# Patient Record
Sex: FEMALE | Race: WHITE | NOT HISPANIC OR LATINO | URBAN - METROPOLITAN AREA
[De-identification: names, ages, dates, MRNs, and addresses within clinical notes are randomized per-mention and may not be internally consistent; named-entity substitution may affect disease eponyms.]

---

## 2019-08-19 ENCOUNTER — OUTPATIENT (OUTPATIENT)
Dept: INPATIENT UNIT | Facility: HOSPITAL | Age: 24
LOS: 1 days | Discharge: HOME | End: 2019-08-19

## 2019-08-19 ENCOUNTER — EMERGENCY (EMERGENCY)
Facility: HOSPITAL | Age: 24
LOS: 1 days | Discharge: STILL ADMITTED OR ADMINISTRATIVE | End: 2019-08-19
Admitting: EMERGENCY MEDICINE

## 2019-08-19 VITALS
OXYGEN SATURATION: 99 % | RESPIRATION RATE: 20 BRPM | HEART RATE: 98 BPM | SYSTOLIC BLOOD PRESSURE: 123 MMHG | DIASTOLIC BLOOD PRESSURE: 81 MMHG | TEMPERATURE: 99 F

## 2019-08-19 VITALS
RESPIRATION RATE: 18 BRPM | HEART RATE: 108 BPM | DIASTOLIC BLOOD PRESSURE: 62 MMHG | TEMPERATURE: 99 F | SYSTOLIC BLOOD PRESSURE: 112 MMHG

## 2019-08-19 DIAGNOSIS — Z3A.00 WEEKS OF GESTATION OF PREGNANCY NOT SPECIFIED: ICD-10-CM

## 2019-08-19 DIAGNOSIS — O26.899 OTHER SPECIFIED PREGNANCY RELATED CONDITIONS, UNSPECIFIED TRIMESTER: ICD-10-CM

## 2019-08-19 LAB
APPEARANCE UR: CLEAR — SIGNIFICANT CHANGE UP
BACTERIA # UR AUTO: NEGATIVE — SIGNIFICANT CHANGE UP
BILIRUB UR-MCNC: NEGATIVE — SIGNIFICANT CHANGE UP
BLOOD UR QL VISUAL: NEGATIVE — SIGNIFICANT CHANGE UP
COLOR SPEC: YELLOW — SIGNIFICANT CHANGE UP
GLUCOSE UR-MCNC: NEGATIVE — SIGNIFICANT CHANGE UP
HYALINE CASTS # UR AUTO: NEGATIVE — SIGNIFICANT CHANGE UP
KETONES UR-MCNC: NEGATIVE — SIGNIFICANT CHANGE UP
LEUKOCYTE ESTERASE UR-ACNC: NEGATIVE — SIGNIFICANT CHANGE UP
NITRITE UR-MCNC: NEGATIVE — SIGNIFICANT CHANGE UP
PH UR: 6 — SIGNIFICANT CHANGE UP (ref 5–8)
PROT UR-MCNC: 30 — SIGNIFICANT CHANGE UP
RBC CASTS # UR COMP ASSIST: SIGNIFICANT CHANGE UP (ref 0–?)
SP GR SPEC: 1.02 — SIGNIFICANT CHANGE UP (ref 1–1.04)
SQUAMOUS # UR AUTO: SIGNIFICANT CHANGE UP
UROBILINOGEN FLD QL: SIGNIFICANT CHANGE UP
WBC UR QL: SIGNIFICANT CHANGE UP (ref 0–?)

## 2019-08-19 RX ORDER — ONDANSETRON 8 MG/1
8 TABLET, FILM COATED ORAL ONCE
Refills: 0 | Status: DISCONTINUED | OUTPATIENT
Start: 2019-08-19 | End: 2019-08-19

## 2019-08-19 RX ORDER — ACETAMINOPHEN 500 MG
975 TABLET ORAL ONCE
Refills: 0 | Status: COMPLETED | OUTPATIENT
Start: 2019-08-19 | End: 2019-08-19

## 2019-08-19 RX ORDER — SODIUM CHLORIDE 9 MG/ML
500 INJECTION, SOLUTION INTRAVENOUS ONCE
Refills: 0 | Status: DISCONTINUED | OUTPATIENT
Start: 2019-08-19 | End: 2019-08-19

## 2019-08-19 RX ADMIN — Medication 975 MILLIGRAM(S): at 22:02

## 2019-08-19 NOTE — OB PROVIDER TRIAGE NOTE - ADDITIONAL INSTRUCTIONS
Follow up at next scheduled prenatal visit at [facility]  Return for decreased fetal movement, loss of fluid or vaginal bleeding  Tylenol every 6 hours for throat soreness as directed  Plain Over the Counter Robitussin as directed for cough  Signs and Symptoms of labor reviewed

## 2019-08-19 NOTE — OB RN TRIAGE NOTE - CHIEF COMPLAINT QUOTE
sharp pain on lower abdomen, sorethroat and fever, body aches and pain sharp pain on lower abdomen, sore throat and fever, body aches and pain

## 2019-08-19 NOTE — OB PROVIDER TRIAGE NOTE - NS_OBGYNHISTORY_OBGYN_ALL_OB_FT
GYN: Rafaela  OB:  12, 8#9, FT, uncomplicated      AP course uncomplicated  [facility] Patient

## 2019-08-19 NOTE — OB PROVIDER TRIAGE NOTE - HISTORY OF PRESENT ILLNESS
24y/o [facility] Patient  @39.5wks presents with sharp vaginal pain and cough for "the past few hours". Denies N/V/D or fever at this time  Reports good fetal movement  Denies LOF/VB  Denies urinary symptoms    Allergies: Denies  Medications: PNV  Denies Medical and Surgical HX  Denies Psy/Etoh/Smoke/Drugs

## 2019-08-19 NOTE — OB PROVIDER TRIAGE NOTE - NSHPPHYSICALEXAM_GEN_ALL_CORE
Assessment reveals VSS, 112/62, Afebrile 37.2C  Abdomen soft, NT, gravid  VE: 1/50/-3    PLAN:  Tylenol  UA  NST  TAS Assessment reveals VSS, 112/62, Afebrile 37.2C  Abdomen soft, NT, gravid  VE: 1/50/-3    PLAN:  Tylenol  UA  NST  TAS    Cat 1 tracing, ctx q2-3mins, mildly felt, pain scale 5/10  UA-WNL  TAS: bpp8/8, vtx, post placenta, SHIMA: 16.69cm

## 2019-08-19 NOTE — OB PROVIDER TRIAGE NOTE - NSHPLABSRESULTS_GEN_ALL_CORE
Urinalysis Basic - ( 19 Aug 2019 21:46 )    Color: YELLOW / Appearance: CLEAR / S.025 / pH: 6.0  Gluc: NEGATIVE / Ketone: NEGATIVE  / Bili: NEGATIVE / Urobili: TRACE   Blood: NEGATIVE / Protein: 30 / Nitrite: NEGATIVE   Leuk Esterase: NEGATIVE / RBC: 0-2 / WBC 3-5   Sq Epi: OCC / Non Sq Epi: x / Bacteria: NEGATIVE

## 2019-08-19 NOTE — ED ADULT TRIAGE NOTE - CHIEF COMPLAINT QUOTE
Pt in her 39th week of pregnancy, , presents with c/o lower abd discomfort, sore throat and fever. Pt states that her primary complaint is her abd discomfort and elects to "have my baby checked first". Pt denies vaginal bleeding or ROM. Mask applied in triage. L&D contacted, pt transported to L&D by EDT.

## 2019-08-22 ENCOUNTER — EMERGENCY (EMERGENCY)
Facility: HOSPITAL | Age: 24
LOS: 1 days | Discharge: STILL ADMITTED OR ADMINISTRATIVE | End: 2019-08-22
Admitting: EMERGENCY MEDICINE

## 2019-08-22 ENCOUNTER — OUTPATIENT (OUTPATIENT)
Dept: OUTPATIENT SERVICES | Facility: HOSPITAL | Age: 24
LOS: 1 days | Discharge: HOME | End: 2019-08-22

## 2019-08-22 VITALS
DIASTOLIC BLOOD PRESSURE: 72 MMHG | RESPIRATION RATE: 15 BRPM | HEART RATE: 84 BPM | SYSTOLIC BLOOD PRESSURE: 122 MMHG | TEMPERATURE: 98 F | OXYGEN SATURATION: 99 %

## 2019-08-22 VITALS
TEMPERATURE: 99 F | RESPIRATION RATE: 16 BRPM | DIASTOLIC BLOOD PRESSURE: 62 MMHG | HEART RATE: 75 BPM | OXYGEN SATURATION: 99 % | SYSTOLIC BLOOD PRESSURE: 108 MMHG

## 2019-08-22 DIAGNOSIS — Z3A.00 WEEKS OF GESTATION OF PREGNANCY NOT SPECIFIED: ICD-10-CM

## 2019-08-22 DIAGNOSIS — O26.899 OTHER SPECIFIED PREGNANCY RELATED CONDITIONS, UNSPECIFIED TRIMESTER: ICD-10-CM

## 2019-08-22 NOTE — ED ADULT TRIAGE NOTE - CHIEF COMPLAINT QUOTE
Pt c/o approx 40 weeks pregnant, KENDRA 8/24, c/o lower abdominal pain.  Pt states was here last week and told she was 1cm dilated. Spoke with D&T, pt to go to L&D.

## 2019-08-23 NOTE — OB PROVIDER TRIAGE NOTE - NSHPPHYSICALEXAM_GEN_ALL_CORE
Vital Signs Last 24 Hrs  T(C): 37.2 (22 Aug 2019 23:32), Max: 37.2 (22 Aug 2019 23:32)  T(F): 99 (22 Aug 2019 23:32), Max: 99 (22 Aug 2019 23:32)  HR: 75 (22 Aug 2019 23:32) (75 - 84)  BP: 108/62 (22 Aug 2019 23:32) (108/62 - 122/72)  BP(mean): --  RR: 16 (22 Aug 2019 23:32) (15 - 16)  SpO2: 99% (22 Aug 2019 23:32) (99% - 99%)    abdomen soft nontender gravid  fhr 135 moderate variability with accelerations   occasional uterine contractions noted on tocometer     VE: 0.5/40/-3

## 2019-08-23 NOTE — OB PROVIDER TRIAGE NOTE - ADDITIONAL INSTRUCTIONS
Patient cleared for discharge home  to f/u with primary provider on tuesday 8/27/2019 as scheduled  labor precautions discussed fetal kick counts reviewed  discharged home ambulatory and stable

## 2019-08-23 NOTE — OB PROVIDER TRIAGE NOTE - NSOBPROVIDERNOTE_OBGYN_ALL_OB_FT
pmh: denies  psh: denies  obhx: TOP w/ D&C x3  - 2012 8lbs 9oz  gynhx: denies    NKDA    Medications: PNV    Assessment  Vital Signs Last 24 Hrs  T(C): 37.2 (22 Aug 2019 23:32), Max: 37.2 (22 Aug 2019 23:32)  T(F): 99 (22 Aug 2019 23:32), Max: 99 (22 Aug 2019 23:32)  HR: 75 (22 Aug 2019 23:32) (75 - 84)  BP: 108/62 (22 Aug 2019 23:32) (108/62 - 122/72)  BP(mean): --  RR: 16 (22 Aug 2019 23:32) (15 - 16)  SpO2: 99% (22 Aug 2019 23:32) (99% - 99%)    abdomen soft nontender gravid  fhr 135 moderate variability with accelerations   occasional uterine contractions noted on tocometer     VE: 0.5/40/-3 pmh: denies  psh: denies  obhx: TOP w/ D&C x3  - 2012 8lbs 9oz  gynhx: denies    NKDA    Medications: PNV    Assessment  Vital Signs Last 24 Hrs  T(C): 37.2 (22 Aug 2019 23:32), Max: 37.2 (22 Aug 2019 23:32)  T(F): 99 (22 Aug 2019 23:32), Max: 99 (22 Aug 2019 23:32)  HR: 75 (22 Aug 2019 23:32) (75 - 84)  BP: 108/62 (22 Aug 2019 23:32) (108/62 - 122/72)  BP(mean): --  RR: 16 (22 Aug 2019 23:32) (15 - 16)  SpO2: 99% (22 Aug 2019 23:32) (99% - 99%)    abdomen soft nontender gravid  fhr 135 moderate variability with accelerations   occasional uterine contractions noted on tocometer     VE: 0.5/40/-3    Cat 1  NST    BPP 88  SHIMA: 16.51cm    Patient cleared for discharge home  reports that she is comfortable at this time; no requests for pain management   to f/u with primary provider on 2019 as scheduled  labor precautions discussed fetal kick counts reviewed  discharged home ambulatory and stable     d/w Dr. limon and Dr Chloe Christy

## 2019-08-23 NOTE — OB PROVIDER TRIAGE NOTE - HISTORY OF PRESENT ILLNESS
23y.o.  at 39.6weeks presenting with complaints of contractions since 0400 yesterday.  Patient reports that contractions began as mild but then began to increase in intensity and timing.  Patient reports that she has also begun to experience an increase in pressure.  Patient was seen in clinic on monday and was noted to be 1cm.  Patient reports good fetal movement, denies lof, denies vaginal bleeding.    a/p course complications patient denies 23y.o.  at 39.6weeks presenting with complaints of contractions since 0 yesterday.  Patient reports that contractions began as mild but then began to increase in intensity and timing.  Patient reports that she has also begun to experience an increase in pressure.  Patient was seen in triage on monday and was noted to be 1cm.  Patient reports good fetal movement, denies lof, denies vaginal bleeding.    a/p course complications patient denies